# Patient Record
Sex: MALE | Race: WHITE | NOT HISPANIC OR LATINO | Employment: FULL TIME | URBAN - METROPOLITAN AREA
[De-identification: names, ages, dates, MRNs, and addresses within clinical notes are randomized per-mention and may not be internally consistent; named-entity substitution may affect disease eponyms.]

---

## 2020-10-18 ENCOUNTER — OFFICE VISIT (OUTPATIENT)
Dept: URGENT CARE | Facility: CLINIC | Age: 25
End: 2020-10-18
Payer: COMMERCIAL

## 2020-10-18 VITALS
TEMPERATURE: 98.3 F | HEART RATE: 72 BPM | DIASTOLIC BLOOD PRESSURE: 62 MMHG | RESPIRATION RATE: 12 BRPM | SYSTOLIC BLOOD PRESSURE: 102 MMHG | HEIGHT: 72 IN | BODY MASS INDEX: 23.73 KG/M2 | WEIGHT: 175.2 LBS | OXYGEN SATURATION: 100 %

## 2020-10-18 DIAGNOSIS — B35.3 TINEA PEDIS OF RIGHT FOOT: ICD-10-CM

## 2020-10-18 DIAGNOSIS — L08.9 LOCAL SKIN INFECTION: ICD-10-CM

## 2020-10-18 PROCEDURE — 99204 OFFICE O/P NEW MOD 45 MIN: CPT | Performed by: NURSE PRACTITIONER

## 2020-10-18 RX ORDER — KETOCONAZOLE 20 MG/G
1 CREAM TOPICAL
Qty: 30 G | Refills: 0 | Status: SHIPPED | OUTPATIENT
Start: 2020-10-18

## 2020-10-18 NOTE — PROGRESS NOTES
Chief Complaint   Patient presents with   • Foot Problem     Possibly althetes foot, been using a cream to treat but not helping x 1 month. Hands are very dry.        HISTORY OF PRESENT ILLNESS: Patient is a 24 y.o. male who presents to urgent care today with complaints of a rash to the bottom of his right foot for the past month. The rash is both painful and pruritic. He has tried OTC anti-fungal without improvement. Denies history of the same. Otherwise feels well and denies fever, chills, malaise, joint pain.     There are no active problems to display for this patient.      Allergies:Patient has no known allergies.    Current Outpatient Medications Ordered in Epic   Medication Sig Dispense Refill   • ketoconazole (NIZORAL) 2 % Cream Apply 1 Application to affected area(s) every bedtime. 30 g 0   • mupirocin (BACTROBAN) 2 % Ointment Apply 1 Application to affected area(s) 3 times a day for 10 days. 22 g 0     No current Epic-ordered facility-administered medications on file.        History reviewed. No pertinent past medical history.    Social History     Tobacco Use   • Smoking status: Never Smoker   • Smokeless tobacco: Never Used   Substance Use Topics   • Alcohol use: Not on file   • Drug use: Never       No family status information on file.   History reviewed. No pertinent family history.    ROS:  Review of Systems   Constitutional: Negative for fever, chills, weight loss, malaise, and fatigue.   HENT: Negative for ear pain, nosebleeds, congestion, sore throat and neck pain.    Eyes: Negative for vision changes.   Neuro: Negative for headache, sensory changes, weakness, seizure, LOC.   Cardiovascular: Negative for chest pain, palpitations, orthopnea and leg swelling.   Respiratory: Negative for cough, sputum production, shortness of breath and wheezing.   Gastrointestinal: Negative for abdominal pain, nausea, vomiting or diarrhea.   Genitourinary: Negative for dysuria, urgency and  frequency.  Musculoskeletal: Negative for falls, neck pain, back pain, joint pain, myalgias.   Skin: Positive for rash. Negative for diaphoresis.     Exam:  /62 (BP Location: Left arm, Patient Position: Sitting, BP Cuff Size: Adult)   Pulse 72   Temp 36.8 °C (98.3 °F) (Temporal)   Resp 12   Ht 1.829 m (6')   Wt 79.5 kg (175 lb 3.2 oz)   SpO2 100%   General: well-nourished, well-developed male in NAD  Head: normocephalic, atraumatic  Eyes: PERRLA, no conjunctival injection, acuity grossly intact, lids normal.  Ears: normal shape and symmetry, no tenderness, no discharge. External canals are without any significant edema or erythema.  Gross auditory acuity is intact.  Nose: symmetrical without tenderness, no discharge.  Mouth/Throat: reasonable hygiene, no erythema, exudates or tonsillar enlargement.  Neck: no masses, range of motion within normal limits, no tracheal deviation. No obvious thyroid enlargement.   Lymph: no cervical adenopathy. No supraclavicular adenopathy.   Neuro: alert and oriented. Cranial nerves 1-12 grossly intact. No sensory deficit.   Cardiovascular: regular rate and rhythm. No edema.  Pulmonary: no distress. Chest is symmetrical with respiration, no wheezes, crackles, or rhonchi.   Musculoskeletal: no clubbing, appropriate muscle tone, gait is stable.  Skin: warm, no clubbing, no cyanosis. There is an excoriated flaky area to plantar aspect of right foot, at the balls of his foot, extending to fifth toe. Area has some fissuring and scant amount of honey colored crusting. No erythema, swelling, streaking, or purulent drainage.   Psych: appropriate mood, affect, judgement.         Assessment/Plan:  1. Tinea pedis of right foot  ketoconazole (NIZORAL) 2 % Cream   2. Local skin infection  mupirocin (BACTROBAN) 2 % Ointment       Presentation consistent with tinea, suspect secondary bacterial involvement. Ketoconazole and Bactroban as directed. Wound and foot care discussed.   Supportive  care, differential diagnoses, and indications for immediate follow-up discussed with patient.   Pathogenesis of diagnosis discussed including typical length and natural progression.   Instructed to return to clinic or nearest emergency department for any change in condition, further concerns, or worsening of symptoms.  Patient states understanding of the plan of care and discharge instructions.  Instructed to make an appointment, for follow up, with his primary care provider.        Please note that this dictation was created using voice recognition software. I have made every reasonable attempt to correct obvious errors, but I expect that there are errors of grammar and possibly content that I did not discover before finalizing the note.      STEFANO Stringer.

## 2021-01-06 ENCOUNTER — OFFICE VISIT (OUTPATIENT)
Dept: URGENT CARE | Facility: CLINIC | Age: 26
End: 2021-01-06
Payer: COMMERCIAL

## 2021-01-06 ENCOUNTER — APPOINTMENT (OUTPATIENT)
Dept: RADIOLOGY | Facility: IMAGING CENTER | Age: 26
End: 2021-01-06
Attending: PHYSICIAN ASSISTANT
Payer: COMMERCIAL

## 2021-01-06 VITALS
HEART RATE: 64 BPM | BODY MASS INDEX: 23.7 KG/M2 | RESPIRATION RATE: 16 BRPM | DIASTOLIC BLOOD PRESSURE: 90 MMHG | HEIGHT: 72 IN | WEIGHT: 175 LBS | SYSTOLIC BLOOD PRESSURE: 124 MMHG | TEMPERATURE: 97.5 F | OXYGEN SATURATION: 97 %

## 2021-01-06 DIAGNOSIS — S62.114A NONDISPLACED FRACTURE OF TRIQUETRUM (CUNEIFORM) BONE, RIGHT WRIST, INITIAL ENCOUNTER FOR CLOSED FRACTURE: ICD-10-CM

## 2021-01-06 DIAGNOSIS — S66.911A STRAIN OF RIGHT WRIST, INITIAL ENCOUNTER: ICD-10-CM

## 2021-01-06 PROCEDURE — 99213 OFFICE O/P EST LOW 20 MIN: CPT | Performed by: PHYSICIAN ASSISTANT

## 2021-01-06 PROCEDURE — 73110 X-RAY EXAM OF WRIST: CPT | Mod: TC,RT | Performed by: PHYSICIAN ASSISTANT

## 2021-01-06 ASSESSMENT — ENCOUNTER SYMPTOMS
CHILLS: 0
FEVER: 0
NAUSEA: 0
COUGH: 0
ABDOMINAL PAIN: 0
VOMITING: 0

## 2021-01-06 NOTE — LETTER
January 6, 2021       Patient: Michael Yancey   YOB: 1995   Date of Visit: 1/6/2021         To Whom It May Concern:    In my medical opinion, I recommend that Michael Yancey should be permitted to work with no use of right hand/wrist until follow-up with orthopedics.      If you have any questions or concerns, please don't hesitate to call [phone]          Sincerely,          Henry Atkinson P.A.-C.  Electronically Signed

## 2021-01-06 NOTE — PROGRESS NOTES
Subjective:   Michael Yancey  is a 25 y.o. male who presents for WRIST PAIN    HPI    Patient comes clinic around 1 week status post injury to right wrist.  He states he was skateboarding when he had a posterior fall reaching behind him had a FOOSH type injury.  Complains of pain at powders/ulnar side of right wrist over the interim.  Denies numbness tingling or weakness.  Complains of limited range of motion in flexion extension of wrist.  Denies crepitus.  Denies history of wrist surgery.  Patient is right-hand dominant.  He has tried ibuprofen as well as OTC bracing for pain which both of helped mildly.    Review of Systems   Constitutional: Negative for chills and fever.   Respiratory: Negative for cough.    Gastrointestinal: Negative for abdominal pain, nausea and vomiting.   Musculoskeletal: Positive for joint pain (right).   Skin: Negative for rash.       No Known Allergies     Objective:   /90 (BP Location: Left arm, Patient Position: Sitting, BP Cuff Size: Adult)   Pulse 64   Temp 36.4 °C (97.5 °F) (Temporal)   Resp 16   Ht 1.829 m (6')   Wt 79.4 kg (175 lb)   SpO2 97%   BMI 23.73 kg/m²     Physical Exam  Vitals signs and nursing note reviewed.   Constitutional:       General: He is not in acute distress.     Appearance: He is well-developed. He is not diaphoretic.   HENT:      Head: Normocephalic and atraumatic.      Right Ear: External ear normal.      Left Ear: External ear normal.      Nose: Nose normal.   Eyes:      General: No scleral icterus.        Right eye: No discharge.         Left eye: No discharge.      Conjunctiva/sclera: Conjunctivae normal.   Neck:      Musculoskeletal: Neck supple.   Pulmonary:      Effort: Pulmonary effort is normal. No respiratory distress.   Musculoskeletal:      Right wrist: He exhibits decreased range of motion and tenderness ( dorsum). He exhibits no bony tenderness ( no snuffbox).   Skin:     General: Skin is warm and dry.      Coloration: Skin is not  pale.   Neurological:      Mental Status: He is alert and oriented to person, place, and time.      Coordination: Coordination normal.        Dx wrist -      1/6/2021 3:37 PM     HISTORY/REASON FOR EXAM:  Pain/Deformity Following Trauma        TECHNIQUE/EXAM DESCRIPTION AND NUMBER OF VIEWS:  4 views of the RIGHT wrist.     COMPARISON:  None     FINDINGS: There is a fracture of the triquetral bone with overlying soft tissue edema. No additional fractures are identified.     IMPRESSION:     Triquetral bone fracture with overlying soft tissue edema             Last Resulted: 01/06/21  3:53 PM        Pt placed in sort arm orthoglass splint.  CMS intact pre and post splinting. Splint care instructions discussed.  Do not remove until seen by otho.  Referral placed. Avoid nsaids until advised otherwise by ortho.     Assessment/Plan:   1. Nondisplaced fracture of triquetrum (cuneiform) bone, right wrist, initial encounter for closed fracture  - DX-WRIST-COMPLETE 3+ RIGHT; Future  - REFERRAL TO ORTHOPEDICS  Recommend conservative care, rest, ice, elevation, OTC Tylenol, follow-up with orthopedics, sent with work note  Return to clinic with lack of resolution or progression of symptoms.      I have worn an N95 mask, gloves and eye protection for the entire encounter with this patient.     Differential diagnosis, natural history, supportive care, and indications for immediate follow-up discussed.

## 2021-03-16 ENCOUNTER — OFFICE VISIT (OUTPATIENT)
Dept: URGENT CARE | Facility: CLINIC | Age: 26
End: 2021-03-16
Payer: COMMERCIAL

## 2021-03-16 VITALS
TEMPERATURE: 97.9 F | SYSTOLIC BLOOD PRESSURE: 116 MMHG | WEIGHT: 184 LBS | HEART RATE: 70 BPM | RESPIRATION RATE: 16 BRPM | BODY MASS INDEX: 24.39 KG/M2 | HEIGHT: 73 IN | DIASTOLIC BLOOD PRESSURE: 80 MMHG | OXYGEN SATURATION: 99 %

## 2021-03-16 DIAGNOSIS — S69.91XS INJURY OF RIGHT WRIST, SEQUELA: ICD-10-CM

## 2021-03-16 DIAGNOSIS — S42.402S CLOSED FRACTURE OF LEFT ELBOW, SEQUELA: ICD-10-CM

## 2021-03-16 PROCEDURE — 99213 OFFICE O/P EST LOW 20 MIN: CPT | Performed by: PHYSICIAN ASSISTANT

## 2021-03-16 ASSESSMENT — ENCOUNTER SYMPTOMS
TINGLING: 0
SENSORY CHANGE: 0
FALLS: 1
NUMBNESS: 0

## 2021-03-16 NOTE — PROGRESS NOTES
"Subjective:      Michael Yancey is a 25 y.o. male who presents with Letter for School/Work (note to go back to work. Lt. elbow and Rt. wrist injury)          Patient is a 25-year-old male who presents to urgent care needing a work note.  Patient does update me and reports on January 6 he had evaluation in urgent care status post fall while skateboarding of which he was noticed possible fracture to the right triquetrum.  Patient then had another injury on January 21 when he had a posterior dislocation to the left elbow with associated avulsion fracture.  This was reduced in the ED.  Since then patient has had follow-up with orthopedist who noted that patient \"did not break his wrist \"and had more injury to the TFCC, and encouraged a brace for \"a few months \".  Patient also completed physical therapy for his left elbow after being and splint-sling.  Patient reports he continues to feel notable pain into the wrist with pushing and pulling i.e. crawling and readily admits that he still unable to do a push-up secondary to pain.  He did attempt to work a \"side job \"a few days ago and noted that crawling made the pain worse.  He does not have upcoming PT evaluation later this week for his right wrist.  He notably admits that his left elbow is feeling substantially better.    Wrist Injury   Incident onset: As above.  Injury mechanism: As above.  The pain is mild. Pertinent negatives include no numbness or tingling. The symptoms are aggravated by movement, palpation and lifting.       Review of Systems   Musculoskeletal: Positive for falls and joint pain.   Neurological: Negative for tingling, sensory change and numbness.   All other systems reviewed and are negative.         Objective:     /80 (BP Location: Left arm, Patient Position: Sitting, BP Cuff Size: Large adult)   Pulse 70   Temp 36.6 °C (97.9 °F) (Temporal)   Resp 16   Ht 1.854 m (6' 1\")   Wt 83.5 kg (184 lb)   SpO2 99%   BMI 24.28 kg/m²    PMH:  has no " past medical history on file.  MEDS: Reviewed .   ALLERGIES: No Known Allergies  SURGHX: No past surgical history on file.  SOCHX:  reports that he has never smoked. He has never used smokeless tobacco. He reports that he does not use drugs.  FH: Family history was reviewed, no pertinent findings to report    Physical Exam  Vitals reviewed.   Constitutional:       General: He is not in acute distress.     Appearance: He is well-developed.   HENT:      Head: Normocephalic and atraumatic.   Eyes:      Conjunctiva/sclera: Conjunctivae normal.      Pupils: Pupils are equal, round, and reactive to light.   Neck:      Trachea: No tracheal deviation.   Cardiovascular:      Rate and Rhythm: Normal rate.   Pulmonary:      Effort: Pulmonary effort is normal. No respiratory distress.   Musculoskeletal:         General: Tenderness present.      Cervical back: Normal range of motion and neck supple.      Comments: Left elbow: Full range of motion flexion extension and supination pronation.  Without bony step-off or noted tenderness.  Without restrictions of motion.      Right wrist: Mild tenderness to the right dorsal aspect-ulnar region.  Painful extension and flexion rotation of the wrist.  Neurovascular intact distally.   is equal bilaterally.       Skin:     General: Skin is warm.      Findings: No rash.   Neurological:      Mental Status: He is alert and oriented to person, place, and time.      Coordination: Coordination normal.   Psychiatric:         Behavior: Behavior normal.         Thought Content: Thought content normal.         Judgment: Judgment normal.                 Assessment/Plan:        1. Injury of right wrist, sequela  2. Closed fracture of left elbow, sequela    Patient continues to report pain with pushing, pulling, crawling.  Work note provided with restrictions and referral to sports medicine was made for further clearance and management.  Appropriate PPE worn at all times by provider.   Pt. Had face  mask on throughout entirety of the visit other than oropharyngeal examination today.     Side effects of OTC or prescribed medications discussed.     DDX, Supportive care, and indications for immediate follow-up discussed with patient.    Instructed to return to clinic or nearest emergency department if we are not available for any change in condition, further concerns, or worsening of symptoms.    The patient and/or guardian demonstrated a good understanding and agreed with the treatment plan.    Please note that this dictation was created using voice recognition software. I have made every reasonable attempt to correct obvious errors, but I expect that there are errors of grammar and possibly content that I did not discover before finalizing the note.

## 2021-03-16 NOTE — LETTER
March 16, 2021         Patient: Michael Yancey   YOB: 1995   Date of Visit: 3/16/2021           To Whom it May Concern:    Michael Yancey was seen in my clinic on 3/16/2021. Patient may return to work however please accommodate this patient with lighter duty- no crawling, heavy lifting/pushing/pulling- no more than 10. Lbs. Patient will need further clearance with Sports Medicine.   If you have any questions or concerns, please don't hesitate to call.        Sincerely,           Jayro Harrington P.A.-C.  Electronically Signed